# Patient Record
Sex: MALE | Race: WHITE | NOT HISPANIC OR LATINO | URBAN - METROPOLITAN AREA
[De-identification: names, ages, dates, MRNs, and addresses within clinical notes are randomized per-mention and may not be internally consistent; named-entity substitution may affect disease eponyms.]

---

## 2018-07-16 ENCOUNTER — OFFICE VISIT (OUTPATIENT)
Dept: URBAN - METROPOLITAN AREA CLINIC 29 | Facility: CLINIC | Age: 78
End: 2018-07-16
Payer: MEDICARE

## 2018-07-16 DIAGNOSIS — H40.1131 PRIMARY OPEN-ANGLE GLAUCOMA, BILATERAL, MILD STAGE: Primary | ICD-10-CM

## 2018-07-16 PROCEDURE — 99213 OFFICE O/P EST LOW 20 MIN: CPT | Performed by: OPTOMETRIST

## 2018-07-16 PROCEDURE — 92083 EXTENDED VISUAL FIELD XM: CPT | Performed by: OPTOMETRIST

## 2018-07-16 ASSESSMENT — INTRAOCULAR PRESSURE
OD: 16
OS: 17

## 2018-07-16 NOTE — IMPRESSION/PLAN
Impression: Primary open-angle glaucoma, bilateral, mild stage: H40.1131. OU. Plan: Discussed diagnosis in detail with patient. Discussed treatment options with patient. No change to current treatment. Will continue to observe condition and or symptoms. Continue using current medication(s).

## 2019-01-14 ENCOUNTER — OFFICE VISIT (OUTPATIENT)
Dept: URBAN - METROPOLITAN AREA CLINIC 29 | Facility: CLINIC | Age: 79
End: 2019-01-14
Payer: MEDICARE

## 2019-01-14 PROCEDURE — 99213 OFFICE O/P EST LOW 20 MIN: CPT | Performed by: OPTOMETRIST

## 2019-01-14 PROCEDURE — 92133 CPTRZD OPH DX IMG PST SGM ON: CPT | Performed by: OPTOMETRIST

## 2019-01-14 ASSESSMENT — INTRAOCULAR PRESSURE
OD: 18
OS: 18

## 2019-01-14 NOTE — IMPRESSION/PLAN
Impression: Primary open-angle glaucoma, bilateral, mild stage: H40.1131. OU. Condition: established, stable. Symptoms: IOP is stable. Plan: Discussed diagnosis in detail with patient. Discussed treatment options with patient. No change to current treatment. Will continue to observe condition and or symptoms. Continue using current medication(s). Emphasized and explained compliance.

## 2019-06-17 ENCOUNTER — OFFICE VISIT (OUTPATIENT)
Dept: URBAN - METROPOLITAN AREA CLINIC 29 | Facility: CLINIC | Age: 79
End: 2019-06-17
Payer: MEDICARE

## 2019-06-17 PROCEDURE — 99213 OFFICE O/P EST LOW 20 MIN: CPT | Performed by: OPTOMETRIST

## 2019-06-17 ASSESSMENT — INTRAOCULAR PRESSURE
OS: 17
OD: 17

## 2019-12-16 ENCOUNTER — OFFICE VISIT (OUTPATIENT)
Dept: URBAN - METROPOLITAN AREA CLINIC 29 | Facility: CLINIC | Age: 79
End: 2019-12-16
Payer: MEDICARE

## 2019-12-16 PROCEDURE — 92083 EXTENDED VISUAL FIELD XM: CPT | Performed by: OPTOMETRIST

## 2019-12-16 PROCEDURE — 99213 OFFICE O/P EST LOW 20 MIN: CPT | Performed by: OPTOMETRIST

## 2019-12-16 RX ORDER — TIMOLOL MALEATE 5 MG/ML
0.5 % SOLUTION/ DROPS OPHTHALMIC
Qty: 10 | Refills: 7 | Status: INACTIVE
Start: 2019-12-16 | End: 2021-01-01

## 2019-12-16 RX ORDER — LATANOPROST 50 UG/ML
0.005 % SOLUTION OPHTHALMIC
Qty: 2.5 | Refills: 10 | Status: INACTIVE
Start: 2019-12-16 | End: 2019-12-31

## 2019-12-16 ASSESSMENT — INTRAOCULAR PRESSURE
OD: 17
OS: 18

## 2019-12-16 NOTE — IMPRESSION/PLAN
Impression: Primary open-angle glaucoma, bilateral, mild stage: H40.1131. OU. Condition: established, stable. Plan: Discussed diagnosis in detail with patient. Discussed treatment options with patient. No change to current treatment. Will continue to observe condition and or symptoms. Continue using current medication(s). Emphasized and explained compliance.

## 2020-09-02 ENCOUNTER — OFFICE VISIT (OUTPATIENT)
Dept: URBAN - METROPOLITAN AREA CLINIC 29 | Facility: CLINIC | Age: 80
End: 2020-09-02
Payer: MEDICARE

## 2020-09-02 DIAGNOSIS — H52.223 REGULAR ASTIGMATISM, BILATERAL: ICD-10-CM

## 2020-09-02 PROCEDURE — 92014 COMPRE OPH EXAM EST PT 1/>: CPT | Performed by: OPTOMETRIST

## 2020-09-02 PROCEDURE — 76514 ECHO EXAM OF EYE THICKNESS: CPT | Performed by: OPTOMETRIST

## 2020-09-02 PROCEDURE — 92133 CPTRZD OPH DX IMG PST SGM ON: CPT | Performed by: OPTOMETRIST

## 2020-09-02 ASSESSMENT — VISUAL ACUITY
OD: 20/20
OS: 20/25

## 2020-09-02 ASSESSMENT — INTRAOCULAR PRESSURE
OD: 17
OS: 17

## 2020-09-02 ASSESSMENT — KERATOMETRY
OD: 42.88
OS: 42.00

## 2020-09-02 NOTE — IMPRESSION/PLAN
Impression: Primary open-angle glaucoma, bilateral, mild stage: H40.1131 OU. Condition: established, stable. Plan: Discussed diagnosis in detail with patient. Discussed treatment options with patient. No change to current treatment. Will continue to observe condition and or symptoms. Continue using current medication(s), timolol BID OU & latanoprost QHS OU. Emphasized and explained compliance.

## 2021-09-27 ENCOUNTER — OFFICE VISIT (OUTPATIENT)
Dept: URBAN - METROPOLITAN AREA CLINIC 29 | Facility: CLINIC | Age: 81
End: 2021-09-27
Payer: MEDICARE

## 2021-09-27 DIAGNOSIS — H35.372 PUCKERING OF MACULA, LEFT EYE: ICD-10-CM

## 2021-09-27 PROCEDURE — 92134 CPTRZ OPH DX IMG PST SGM RTA: CPT | Performed by: OPTOMETRIST

## 2021-09-27 PROCEDURE — 92133 CPTRZD OPH DX IMG PST SGM ON: CPT | Performed by: OPTOMETRIST

## 2021-09-27 PROCEDURE — 92014 COMPRE OPH EXAM EST PT 1/>: CPT | Performed by: OPTOMETRIST

## 2021-09-27 ASSESSMENT — INTRAOCULAR PRESSURE
OD: 16
OS: 18

## 2022-03-31 ENCOUNTER — OFFICE VISIT (OUTPATIENT)
Dept: URBAN - METROPOLITAN AREA CLINIC 28 | Facility: CLINIC | Age: 82
End: 2022-03-31
Payer: MEDICARE

## 2022-03-31 PROCEDURE — 92083 EXTENDED VISUAL FIELD XM: CPT | Performed by: OPTOMETRIST

## 2022-03-31 PROCEDURE — 99213 OFFICE O/P EST LOW 20 MIN: CPT | Performed by: OPTOMETRIST

## 2022-03-31 ASSESSMENT — INTRAOCULAR PRESSURE
OS: 15
OD: 14

## 2022-03-31 NOTE — IMPRESSION/PLAN
Impression: Primary open-angle glaucoma, bilateral, mild stage: H40.1131 OU. Condition: established, stable. Plan: Discussed diagnosis in detail with patient. Discussed treatment options with patient. No change to current treatment. Will continue to observe condition and or symptoms. Continue using current medication(s), Latanoprost QHS OU & Timolol BID OU. Emphasized and explained compliance.

## 2022-10-03 ENCOUNTER — OFFICE VISIT (OUTPATIENT)
Dept: URBAN - METROPOLITAN AREA CLINIC 28 | Facility: CLINIC | Age: 82
End: 2022-10-03
Payer: MEDICARE

## 2022-10-03 DIAGNOSIS — H40.1131 PRIMARY OPEN-ANGLE GLAUCOMA, BILATERAL, MILD STAGE: Primary | ICD-10-CM

## 2022-10-03 PROCEDURE — 99213 OFFICE O/P EST LOW 20 MIN: CPT | Performed by: OPTOMETRIST

## 2022-10-03 PROCEDURE — 92133 CPTRZD OPH DX IMG PST SGM ON: CPT | Performed by: OPTOMETRIST

## 2022-10-03 RX ORDER — LATANOPROST 50 UG/ML
0.005 % SOLUTION OPHTHALMIC
Qty: 2.5 | Refills: 11 | Status: ACTIVE
Start: 2022-10-03

## 2022-10-03 ASSESSMENT — INTRAOCULAR PRESSURE
OD: 15
OS: 16

## 2022-10-03 NOTE — IMPRESSION/PLAN
Impression: Primary open-angle glaucoma, bilateral, mild stage: H40.1131. Plan: Discussed diagnosis in detail with patient. Discussed treatment options with patient. OCT ON ordered & reviewed with patient No change to current treatment. Will continue to observe condition and or symptoms. Continue using current medication Latanoprost QHS and Timolol BID. Emphasized and explained compliance.

## 2023-06-28 ENCOUNTER — OFFICE VISIT (OUTPATIENT)
Dept: URBAN - METROPOLITAN AREA CLINIC 28 | Facility: CLINIC | Age: 83
End: 2023-06-28
Payer: MEDICARE

## 2023-06-28 DIAGNOSIS — H04.123 DRY EYE SYNDROME OF BILATERAL LACRIMAL GLANDS: ICD-10-CM

## 2023-06-28 DIAGNOSIS — H40.1131 PRIMARY OPEN-ANGLE GLAUCOMA, BILATERAL, MILD STAGE: Primary | ICD-10-CM

## 2023-06-28 DIAGNOSIS — H35.372 PUCKERING OF MACULA, LEFT EYE: ICD-10-CM

## 2023-06-28 PROCEDURE — 99213 OFFICE O/P EST LOW 20 MIN: CPT | Performed by: OPTOMETRIST

## 2023-06-28 ASSESSMENT — INTRAOCULAR PRESSURE
OS: 15
OD: 15

## 2023-06-28 NOTE — IMPRESSION/PLAN
Impression: Primary open-angle glaucoma, bilateral, mild stage: H40.1131. Plan: Discussed diagnosis in detail with patient. Discussed treatment options with patient. No change to current treatment. Will continue to observe condition and or symptoms. Continue using current medication(s) latanoprost QHS OU, timolol BID OU. Emphasized and explained compliance.